# Patient Record
Sex: MALE | Race: OTHER
[De-identification: names, ages, dates, MRNs, and addresses within clinical notes are randomized per-mention and may not be internally consistent; named-entity substitution may affect disease eponyms.]

---

## 2020-10-19 ENCOUNTER — HOSPITAL ENCOUNTER (OUTPATIENT)
Dept: HOSPITAL 53 - M RAD | Age: 25
End: 2020-10-19
Attending: SURGERY
Payer: COMMERCIAL

## 2020-10-19 DIAGNOSIS — M79.671: Primary | ICD-10-CM

## 2020-10-19 NOTE — REP
INDICATION:

RIGHT TARSAL PAIN***INMATE***



COMPARISON:

None.



TECHNIQUE:

AP, lateral, bilateral oblique views .



FINDINGS:

The osseous structures and joint spaces are intact and normal.  There is no evidence

for acute fracture or dislocation.  Surrounding soft tissues are unremarkable.  No

subcutaneous emphysema or radiodense foreign body.



IMPRESSION:

.  No acute fracture or dislocation.



<Electronically signed by Familia Stewart > 10/19/20 0993